# Patient Record
Sex: FEMALE | Race: WHITE | Employment: STUDENT | URBAN - METROPOLITAN AREA
[De-identification: names, ages, dates, MRNs, and addresses within clinical notes are randomized per-mention and may not be internally consistent; named-entity substitution may affect disease eponyms.]

---

## 2021-07-08 ENCOUNTER — HOSPITAL ENCOUNTER (OUTPATIENT)
Dept: PREADMISSION TESTING | Age: 21
Discharge: HOME | End: 2021-07-08
Payer: COMMERCIAL

## 2021-07-08 PROCEDURE — U0003 INFECTIOUS AGENT DETECTION BY NUCLEIC ACID (DNA OR RNA); SEVERE ACUTE RESPIRATORY SYNDROME CORONAVIRUS 2 (SARS-COV-2) (CORONAVIRUS DISEASE [COVID-19]), AMPLIFIED PROBE TECHNIQUE, MAKING USE OF HIGH THROUGHPUT TECHNOLOGIES AS DESCRIBED BY CMS-2020-01-R: HCPCS

## 2021-07-09 LAB
SARS-COV-2, XPLCVT: NOT DETECTED
SOURCE, COVRS: NORMAL

## 2021-07-12 ENCOUNTER — HOSPITAL ENCOUNTER (OUTPATIENT)
Age: 21
Setting detail: OUTPATIENT SURGERY
Discharge: HOME | End: 2021-07-12
Attending: SPECIALIST | Admitting: SPECIALIST
Payer: COMMERCIAL

## 2021-07-12 VITALS
RESPIRATION RATE: 16 BRPM | DIASTOLIC BLOOD PRESSURE: 87 MMHG | OXYGEN SATURATION: 98 % | HEIGHT: 63 IN | HEART RATE: 88 BPM | SYSTOLIC BLOOD PRESSURE: 129 MMHG

## 2021-07-12 PROCEDURE — 74011000250 HC RX REV CODE- 250: Performed by: SPECIALIST

## 2021-07-12 PROCEDURE — 2709999900 HC NON-CHARGEABLE SUPPLY: Performed by: SPECIALIST

## 2021-07-12 PROCEDURE — 76040000007: Performed by: SPECIALIST

## 2021-07-12 RX ORDER — LIDOCAINE HYDROCHLORIDE 20 MG/ML
JELLY TOPICAL ONCE
Status: COMPLETED | OUTPATIENT
Start: 2021-07-12 | End: 2021-07-12

## 2021-07-12 RX ORDER — FLUVOXAMINE MALEATE 100 MG/1
300 TABLET, COATED ORAL EVERY EVENING
COMMUNITY

## 2021-07-12 RX ORDER — NORETHINDRONE ACETATE AND ETHINYL ESTRADIOL 1; .02 MG/1; MG/1
1 TABLET ORAL DAILY
COMMUNITY

## 2021-07-12 RX ADMIN — LIDOCAINE HYDROCHLORIDE 5 ML: 20 JELLY TOPICAL at 08:29

## 2021-07-12 NOTE — PROGRESS NOTES
5cc viscous lidocaine inhaled into left nare per MD orders. Probe inserted into  left nare without difficulty. Pt tolerated procedure well. After completion of EMOT, Patient declined the 24hr pH study. RN instructed pt to call Dr Scot Escobar office and let her know in case she wants to do something different. She states her gag reflex is too extreme to wear it for a long period of time and she was not aware that it would be extended from her nose and connected to a box.

## 2021-07-19 NOTE — PROCEDURES
Esophageal High-Resolution Manometry Report Summary     Date HRM Performed: 7/12/21  Referring provider: KAYLEE Nolan  Indication: Reflux, dysphagia  ? PROCEDURE: A solid-state recording assembly comprised of 36 circumferential pressure sensors spaced at 1 cm intervals was placed transnasally into the esophagus and positioned through the EGJ. The patient was positioned in the supine position. Mean EGJ junction pressures were measured during a 30-second baseline recording during which the patient was instructed to minimize swallowing. Contractility and pressurization pattern was assessed during ten 5-ml water swallows in the supine position at least 20-30 seconds apart, to generate the [facility] Classification diagnosis. Moreover, multiple rapid swallows (5 2-mL water swallows <3 seconds apart) were performed. ? RESULTS:   Assembly traversed diaphragm? Yes   Location of proximal border of LES: 40.5 cm  EGJ morphology: Type 1  LES-CD separation: 0 cm   End-expiratory LESP: 25.4 mm Hg (nl 4.8-32.0 mm Hg)  Mid-respiratory LESP: 38 mm Hg (nl 13-43 mm Hg)  Mean IRP: 9.8 mm Hg (nl <15 mm Hg)  Mean DCI: 850 mm Hg-cm-sec (nl 450-8000)  Swallows: 4 weak, 6 intact   6/10 swallows with incomplete transit seen on impedance. MRS with augmentation intact. ?  IMPRESSION:  EGJ: The EGJ morphology is consistent with type I and is normotensive. There is evidence of normal EGJ outflow pressures based on IRP. Esophageal body: The contractile pattern is consistent with normal peristalsis/contractility. These findings are consistent with a Duncan Classification 3.0 diagnosis of normal motility. I will note 40% of swallows were weak, though in order to meet criteria for ineffective esophageal motility at least 50% of swallows need to be weak or failed. Thus, while this is normal esophageal motility study, it is possible intermittent weak swallows are contributing to her symptoms.

## 2023-05-11 RX ORDER — FLUVOXAMINE MALEATE 100 MG
TABLET ORAL EVERY EVENING
COMMUNITY

## 2023-05-11 RX ORDER — NORETHINDRONE ACETATE AND ETHINYL ESTRADIOL 1; .02 MG/1; MG/1
1 TABLET ORAL DAILY
COMMUNITY

## 2025-04-23 ENCOUNTER — APPOINTMENT (OUTPATIENT)
Facility: HOSPITAL | Age: 25
End: 2025-04-23
Payer: COMMERCIAL

## 2025-04-23 ENCOUNTER — HOSPITAL ENCOUNTER (EMERGENCY)
Facility: HOSPITAL | Age: 25
Discharge: HOME | End: 2025-04-23
Attending: EMERGENCY MEDICINE
Payer: COMMERCIAL

## 2025-04-23 VITALS
SYSTOLIC BLOOD PRESSURE: 124 MMHG | HEART RATE: 114 BPM | OXYGEN SATURATION: 99 % | BODY MASS INDEX: 30.34 KG/M2 | RESPIRATION RATE: 16 BRPM | DIASTOLIC BLOOD PRESSURE: 82 MMHG | WEIGHT: 171.3 LBS | TEMPERATURE: 98.1 F

## 2025-04-23 DIAGNOSIS — K59.00 CONSTIPATION, UNSPECIFIED CONSTIPATION TYPE: Primary | ICD-10-CM

## 2025-04-23 LAB — HCG UR QL: NEGATIVE

## 2025-04-23 PROCEDURE — 74018 RADEX ABDOMEN 1 VIEW: CPT

## 2025-04-23 PROCEDURE — 99283 EMERGENCY DEPT VISIT LOW MDM: CPT

## 2025-04-23 PROCEDURE — 81025 URINE PREGNANCY TEST: CPT

## 2025-04-23 RX ORDER — TRAZODONE HYDROCHLORIDE 50 MG/1
50 TABLET ORAL NIGHTLY
COMMUNITY

## 2025-04-23 RX ORDER — POLYETHYLENE GLYCOL 3350 17 G/17G
17 POWDER, FOR SOLUTION ORAL 3 TIMES DAILY PRN
Qty: 510 G | Refills: 0 | Status: SHIPPED | OUTPATIENT
Start: 2025-04-23 | End: 2025-05-23

## 2025-04-23 RX ORDER — BUPROPION HYDROCHLORIDE 300 MG/1
300 TABLET ORAL EVERY MORNING
COMMUNITY

## 2025-04-23 ASSESSMENT — PAIN - FUNCTIONAL ASSESSMENT: PAIN_FUNCTIONAL_ASSESSMENT: NONE - DENIES PAIN

## 2025-04-23 ASSESSMENT — ENCOUNTER SYMPTOMS
VOMITING: 0
SORE THROAT: 0
COUGH: 0

## 2025-04-23 NOTE — ED PROVIDER NOTES
[facility]  EMERGENCY DEPARTMENT ENCOUNTER      Pt Name: Gloria Jones  MRN: [id number]  Birthdate 2000  Date of evaluation: 4/23/2025  Provider: Fabian Ross MD    CHIEF COMPLAINT       Chief Complaint   Patient presents with    Constipation         HISTORY OF PRESENT ILLNESS   (Location/Symptom, Timing/Onset, Context/Setting, Quality, Duration, Modifying Factors, Severity)  Note limiting factors.   24-year-old female presents from home with complaint of no bowel movement for 19 days.  States he does have history of chronic constipation but never gone this long before.  States that she thinks it secondary to Wellbutrin.  She does report some bloating but denies any nausea vomiting, abdominal pain.  States she had some loose stool few days ago but not a good bowel movement.  She has been taking MiraLAX without relief.  [facility] Senokot.  No other complaints.    The history is provided by the patient.         Review of External Medical Records:     Nursing Notes were reviewed.    REVIEW OF SYSTEMS    (2-9 systems for level 4, 10 or more for level 5)     Review of Systems   Constitutional:  Negative for fatigue.   HENT:  Negative for sore throat.    Eyes:  Negative for visual disturbance.   Respiratory:  Negative for cough.    Cardiovascular:  Negative for palpitations.   Gastrointestinal:  Negative for vomiting.   Genitourinary:  Negative for difficulty urinating.   Musculoskeletal:  Negative for myalgias.   Skin:  Negative for rash.   Neurological:  Negative for weakness.       Except as noted above the remainder of the review of systems was reviewed and negative.       PAST MEDICAL HISTORY     Past Medical History:   Diagnosis Date    Autoimmune disorder     GERD (gastroesophageal reflux disease)     PUD (peptic ulcer disease)          SURGICAL HISTORY       Past Surgical History:   Procedure Laterality Date    HEENT  2020    WISDOM TEETH

## 2025-04-23 NOTE — DISCHARGE INSTR - COC
Continuity of Care Form    Patient Name: Gloria Jones   :  2000  MRN:  [id number]    Admit date:  2025  Discharge date:  ***    Code Status Order: No Order   Advance Directives:     Admitting Physician:  No admitting provider for patient encounter.  PCP: Irma Su MD    Discharging Nurse: ***  Discharging Hospital Unit/Room#: SER01/01  Discharging Unit Phone Number: ***    Emergency Contact:   Extended Emergency Contact Information  Primary Emergency Contact: Shawn Jones  Address: [address] [place] [id number] [facility]  Home Phone: [phone]  Mobile Phone: [phone]  Relation: Parent  Secondary Emergency Contact: Abhinav jones  Mobile Phone: [phone]  Relation: Parent    Past Surgical History:  Past Surgical History:   Procedure Laterality Date    HEENT      WISDOM TEETH    TONSILLECTOMY  2010    TONSILS AND ADNOIDS       Immunization History:     There is no immunization history on file for this patient.    Active Problems:  There is no problem list on file for this patient.      Isolation/Infection:   Isolation            No Isolation          Patient Infection Status    None to display         Nurse Assessment:  Last Vital Signs: /82   Pulse (!) 118   Temp 98.1 °F (36.7 °C) (Oral)   Resp 16   Wt 77.7 kg (171 lb 4.8 oz)   SpO2 99%   BMI 30.34 kg/m²     Last documented pain score (0-10 scale):    Last Weight:   Wt Readings from Last 1 Encounters:   25 77.7 kg (171 lb 4.8 oz)     Mental Status:  {IP PT MENTAL STATUS:61978}    IV Access:  { TANYA IV ACCESS:397678092}    Nursing Mobility/ADLs:  Walking   {CHP DME ADLs:113965498}  Transfer  {CHP DME ADLs:346231754}  Bathing  {CHP DME ADLs:554874103}  Dressing  {CHP DME ADLs:748440034}  Toileting  {CHP DME ADLs:614264753}  Feeding  {CHP DME ADLs:106644940}  Med Admin  {CHP DME ADLs:370279076}  Med Delivery   { TANYA MED Delivery:808374549}    Wound Care Documentation and Therapy:

## (undated) DEVICE — SYR 10ML LUER LOK 1/5ML GRAD --

## (undated) DEVICE — Device

## (undated) DEVICE — SYRINGE 50ML E/T

## (undated) DEVICE — BASIN EMSIS 16OZ GRAPHITE PLAS KID SHP MOLD GRAD FOR ORAL